# Patient Record
Sex: MALE | Race: OTHER | Employment: UNEMPLOYED | URBAN - METROPOLITAN AREA
[De-identification: names, ages, dates, MRNs, and addresses within clinical notes are randomized per-mention and may not be internally consistent; named-entity substitution may affect disease eponyms.]

---

## 2024-02-16 ENCOUNTER — HOSPITAL ENCOUNTER (EMERGENCY)
Facility: HOSPITAL | Age: 39
Discharge: HOME | End: 2024-02-16
Attending: EMERGENCY MEDICINE

## 2024-02-16 VITALS
SYSTOLIC BLOOD PRESSURE: 130 MMHG | RESPIRATION RATE: 16 BRPM | HEART RATE: 81 BPM | OXYGEN SATURATION: 98 % | DIASTOLIC BLOOD PRESSURE: 84 MMHG | TEMPERATURE: 98 F

## 2024-02-16 DIAGNOSIS — R04.0 EPISTAXIS: Primary | ICD-10-CM

## 2024-02-16 PROCEDURE — 99283 EMERGENCY DEPT VISIT LOW MDM: CPT

## 2024-02-16 PROCEDURE — 30901 CONTROL OF NOSEBLEED: CPT

## 2024-02-16 RX ORDER — OXYMETAZOLINE HYDROCHLORIDE 0.05 G/100ML
1 SPRAY NASAL EVERY 12 HOURS PRN
Status: DISCONTINUED | OUTPATIENT
Start: 2024-02-16 | End: 2024-02-16

## 2024-02-16 RX ORDER — TRANEXAMIC ACID 100 MG/ML
5 INJECTION, SOLUTION INTRAVENOUS ONCE
Status: DISCONTINUED | OUTPATIENT
Start: 2024-02-16 | End: 2024-02-16

## 2024-02-16 NOTE — ED INITIAL ASSESSMENT (HPI)
Patient to the ER c/o nose bleed. Patient reports started 3 days ago. No known trauma. Admits to cocaine use.

## 2024-02-16 NOTE — ED QUICK NOTES
Bleeding is resolved when nosebleed clip removed. Pt ambulated around nurse's station, no bleeding occurred.

## 2024-02-16 NOTE — ED PROVIDER NOTES
Patient Seen in: [facility] Emergency Department      History     Chief Complaint   Patient presents with    Nose Bleed     Stated Complaint: nose bleed x 3 days, no thinners    Subjective:   HPI    Patient is a 38-year-old male presents emergency room for evaluation of right-sided epistaxis.  Patient states he had intermittent nosebleed for 3 days.  He is Korean-speaking and friend at bedside to interpret per patient preference.  He is not on blood thinners.  Admits to using cocaine over the last few days.  Denies any blood in his throat.  No other complaints    Objective:   History reviewed. No pertinent past medical history.           History reviewed. No pertinent surgical history.             Social History     Socioeconomic History    Marital status: Single   Tobacco Use    Smoking status: Every Day     Types: Cigarettes    Smokeless tobacco: Never              Review of Systems    Positive for stated complaint: nose bleed x 3 days, no thinners  Other systems are as noted in HPI.  Constitutional and vital signs reviewed.      All other systems reviewed and negative except as noted above.    Physical Exam     ED Triage Vitals [02/16/24 0218]   /84   Pulse 81   Resp 16   Temp 97.8 °F (36.6 °C)   Temp src Temporal   SpO2 98 %   O2 Device None (Room air)       Current:/84   Pulse 81   Temp 97.8 °F (36.6 °C) (Temporal)   Resp 16   SpO2 98%         Physical Exam    Constitutional: Well-appearing in no acute distress  HEENT: Left naris is clear.  Right naris without any evidence for active bleeding  Skin: Warm and dry    ED Course   Labs Reviewed - No data to display          Medications   silver nitrate-potassium nitrate 75-25% (Arzol Silver Nitrate) 75-25 % external applicator 1 each (1 each Topical Given by Other 2/16/24 9372)   benzocaine (Hurricaine/Topex) 20 % mouth spray 1 spray ( Mouth/Throat Given 2/16/24 2465)              MDM      Patient is a 38-year-old male presents to emergency  room for evaluation of epistaxis.  Differential includes anterior epistaxis, posterior epistaxis.  Patient had Afrin and HurriCaine spray instilled into the right naris.  He had nasal cautery performed of the right medial nasal septum.  He was observed for 30 minutes.  He did have a brief episode of bleeding that resolved.  Nasal packing was not indicated.  He was advised to hold pressure at home and return to ED for packing of any further bleeding uncontrolled pressure at home.  Advised to stop using cocaine.    Patient was screened and evaluated during this visit.   As a treating physician attending to the patient, I determined, within reasonable clinical confidence and prior to discharge, that an emergency medical condition was not or was no longer present.  There was no indication for further evaluation, treatment or admission on an emergency basis.  Comprehensive verbal and written discharge and follow-up instructions were provided to help prevent relapse or worsening.  Patient was instructed to follow-up with her primary care provider for further evaluation and treatment, but to return immediately to the ER for worsening, concerning, new, changing or persisting symptoms.  I discussed the case with the patient and they had no questions, complaints, or concerns.  Patient felt comfortable going home.                         MDM    Disposition and Plan     Clinical Impression:  1. Epistaxis         Disposition:  Discharge  2/16/2024  3:14 am    Follow-up:  Dorian Ortega MD   [address] [address]  [phone]    Follow up  As needed          Medications Prescribed:  There are no discharge medications for this patient.

## 2024-02-16 NOTE — ED QUICK NOTES
Pt stood from bed and nose began bleeding. Large clot noted to come from right naris. Gauze and nosebleed clip in place. EDMD notified.

## 2025-06-29 ENCOUNTER — HOSPITAL ENCOUNTER (EMERGENCY)
Facility: HOSPITAL | Age: 40
Discharge: HOME | End: 2025-06-29
Attending: EMERGENCY MEDICINE
Payer: OTHER MISCELLANEOUS

## 2025-06-29 VITALS
RESPIRATION RATE: 16 BRPM | HEART RATE: 78 BPM | OXYGEN SATURATION: 99 % | TEMPERATURE: 99 F | SYSTOLIC BLOOD PRESSURE: 126 MMHG | DIASTOLIC BLOOD PRESSURE: 74 MMHG | WEIGHT: 176.38 LBS

## 2025-06-29 DIAGNOSIS — S61.211A LACERATION OF LEFT INDEX FINGER WITHOUT FOREIGN BODY WITHOUT DAMAGE TO NAIL, INITIAL ENCOUNTER: Primary | ICD-10-CM

## 2025-06-29 PROCEDURE — 99283 EMERGENCY DEPT VISIT LOW MDM: CPT

## 2025-06-29 PROCEDURE — 90471 IMMUNIZATION ADMIN: CPT

## 2025-06-29 NOTE — ED PROVIDER NOTES
Patient Seen in: [facility] Emergency Department        History  Chief Complaint   Patient presents with    Laceration/Abrasion     Stated Complaint: left hand 1st digit laceration    Subjective:   HPI            40-year-old male comes to the hospital with a laceration to his index finger on his left hand.  This happened last night at about 7 PM.  It happened with a knife.  He denies any loss of function, numbness or weakness.  He says been many many years since he had his last tetanus shot and believes he needs 1.  Is denying any other complaints.      Objective:     History reviewed. No pertinent past medical history.           History reviewed. No pertinent surgical history.             Social History     Socioeconomic History    Marital status: Single   Tobacco Use    Smoking status: Every Day     Types: Cigarettes    Smokeless tobacco: Never   Vaping Use    Vaping status: Never Used   Substance and Sexual Activity    Alcohol use: Yes     Comment: Sometimes    Drug use: Never                                Physical Exam    ED Triage Vitals [06/29/25 1245]   /74   Pulse 78   Resp 16   Temp 98.9 °F (37.2 °C)   Temp src Oral   SpO2 99 %   O2 Device None (Room air)       Current Vitals:   Vital Signs  BP: 126/74  Pulse: 78  Resp: 16  Temp: 98.9 °F (37.2 °C)  Temp src: Oral    Oxygen Therapy  SpO2: 99 %  O2 Device: None (Room air)            Physical Exam  Extremity laceration noted to the tip of his index finger on his left hand without active bleeding or sign of infection.  Range of motion is spared and is otherwise neurovasc intact.  I see no deep structure involvement.        ED Course  Labs Reviewed - No data to display       At this time secondary to timing of this laceration and location I believe sutures will increased rate of infection and at this time we provided a sepsis to the finger as well as Steri-Strips and the 2 gauze.  The patient's tetanus was updated.                  MDM      Differential diagnosis included avulsion versus laceration but not limited these.  Patient is a simple laceration but at this time is been too late to suture.  The patient was treated with Steri-Strip strips, twos gauze, a sepsis and tetanus update    Patient was screened and evaluated during this visit.   As a treating physician attending to the patient, I determined, within reasonable clinical confidence and prior to discharge, that an emergency medical condition was not or was no longer present.  There was no indication for further evaluation, treatment or admission on an emergency basis.       The usual and customary discharge instuctions were discussed given the patient's ER course.  We discussed signs and symptoms that should prompt the patient's immediate return to the emergency department.   Reasonable over the counter and prescription treatment options and Physician follow up plan was discussed.       The patient is discharged in good condition.     This note was prepared using Dragon Medical voice recognition dictation software.  As a result errors may occur.  When identified to these areas have been corrected.  While every attempt is made to correct errors during dictation discrepancies may still exist.  Please contact if there are any errors.        Medical Decision Making      Disposition and Plan     Clinical Impression:  1. Laceration of left index finger without foreign body without damage to nail, initial encounter         Disposition:  Discharge  6/29/2025 12:59 pm    Follow-up:  Jacey Alvarez DO  [address] [address]  [phone]    Schedule an appointment as soon as possible for a visit in 3 day(s)            Medications Prescribed:  There are no discharge medications for this patient.            Supplementary Documentation:

## 2025-06-29 NOTE — ED INITIAL ASSESSMENT (HPI)
PT states that sustained a laceration to his left hand's second digit with a knife while at work. Area has dressing on it during triage assessment